# Patient Record
Sex: FEMALE | URBAN - METROPOLITAN AREA
[De-identification: names, ages, dates, MRNs, and addresses within clinical notes are randomized per-mention and may not be internally consistent; named-entity substitution may affect disease eponyms.]

---

## 2022-02-02 ENCOUNTER — TELEPHONE (OUTPATIENT)
Dept: GASTROENTEROLOGY | Facility: CLINIC | Age: 72
End: 2022-02-02
Payer: MEDICARE

## 2022-02-02 NOTE — TELEPHONE ENCOUNTER
In December of 2021 the patient was supposed to get a CBC, CMP, ESR, CRP, IBD extended profile and stool calprotectin.  She was going to have this completed at Dr. Schaffer office.  Did she?  I did not get the results.  NBP

## 2022-02-07 NOTE — TELEPHONE ENCOUNTER
I spoke with the patient who states that she did not do labs, but that she will call us when she decides to so that we may look out for results.  SAMUELL, CMA

## 2022-02-10 NOTE — TELEPHONE ENCOUNTER
I reviewed her CT images with radiology this Tuesday. The areas on the CT of the small bowel were not associated with bowel thickening or surrounding inflammation. The TI was normal. The narrowings were symmetrical. These findings are more consistent with peristalsis than stricturing disease. Send copy of these notes to her [facility] since she is declining my work up.  NBP

## 2024-07-18 DIAGNOSIS — R19.7 DIARRHEA: Primary | ICD-10-CM

## 2024-07-18 DIAGNOSIS — Z87.19 HISTORY OF COLITIS: ICD-10-CM

## 2024-07-21 ENCOUNTER — TELEPHONE (OUTPATIENT)
Dept: GASTROENTEROLOGY | Facility: CLINIC | Age: 74
End: 2024-07-21
Payer: MEDICARE

## 2024-07-21 NOTE — TELEPHONE ENCOUNTER
Referral reviewed. Reviewed [facility] chart. Patient of DANK/mine who had declined further work up from me previously. Will need eval for repeat imaging, colonoscopy and blood work.    Okay to schedule next available OV with NP.  DANK